# Patient Record
Sex: FEMALE | Race: WHITE | NOT HISPANIC OR LATINO
[De-identification: names, ages, dates, MRNs, and addresses within clinical notes are randomized per-mention and may not be internally consistent; named-entity substitution may affect disease eponyms.]

---

## 2017-07-24 ENCOUNTER — CHARTING TRANS (OUTPATIENT)
Dept: OTHER | Age: 57
End: 2017-07-24

## 2017-07-24 ENCOUNTER — LAB SERVICES (OUTPATIENT)
Dept: OTHER | Age: 57
End: 2017-07-24

## 2017-07-24 LAB — RAPID STREP GROUP A: POSITIVE

## 2018-03-26 ENCOUNTER — CHARTING TRANS (OUTPATIENT)
Dept: OTHER | Age: 58
End: 2018-03-26

## 2018-11-01 VITALS
WEIGHT: 120 LBS | HEART RATE: 72 BPM | TEMPERATURE: 99.5 F | HEIGHT: 62 IN | RESPIRATION RATE: 16 BRPM | BODY MASS INDEX: 22.08 KG/M2 | SYSTOLIC BLOOD PRESSURE: 125 MMHG | DIASTOLIC BLOOD PRESSURE: 82 MMHG

## 2018-11-03 VITALS
SYSTOLIC BLOOD PRESSURE: 143 MMHG | RESPIRATION RATE: 16 BRPM | HEIGHT: 62 IN | HEART RATE: 68 BPM | DIASTOLIC BLOOD PRESSURE: 80 MMHG | OXYGEN SATURATION: 97 % | BODY MASS INDEX: 22.08 KG/M2 | WEIGHT: 120 LBS | TEMPERATURE: 98.3 F

## 2019-01-18 ENCOUNTER — WALK IN (OUTPATIENT)
Dept: URGENT CARE | Age: 59
End: 2019-01-18

## 2019-01-18 VITALS
RESPIRATION RATE: 16 BRPM | DIASTOLIC BLOOD PRESSURE: 88 MMHG | BODY MASS INDEX: 22.6 KG/M2 | HEART RATE: 75 BPM | SYSTOLIC BLOOD PRESSURE: 130 MMHG | WEIGHT: 122.8 LBS | TEMPERATURE: 98 F | HEIGHT: 62 IN

## 2019-01-18 DIAGNOSIS — B95.0 STREPTOCOCCAL INFECTION GROUP A: Primary | ICD-10-CM

## 2019-01-18 LAB
INTERNAL PROCEDURAL CONTROLS ACCEPTABLE: YES
S PYO AG THROAT QL IA.RAPID: POSITIVE

## 2019-01-18 PROCEDURE — 99214 OFFICE O/P EST MOD 30 MIN: CPT | Performed by: NURSE PRACTITIONER

## 2019-01-18 PROCEDURE — 87880 STREP A ASSAY W/OPTIC: CPT | Performed by: NURSE PRACTITIONER

## 2019-01-18 RX ORDER — TRIAMCINOLONE ACETONIDE 55 UG/1
2 SPRAY, METERED NASAL
COMMUNITY

## 2019-01-18 RX ORDER — AMOXICILLIN 875 MG/1
875 TABLET, COATED ORAL 2 TIMES DAILY
Qty: 20 TABLET | Refills: 0 | Status: SHIPPED | OUTPATIENT
Start: 2019-01-18 | End: 2019-01-28

## 2019-01-18 SDOH — HEALTH STABILITY: MENTAL HEALTH: HOW OFTEN DO YOU HAVE A DRINK CONTAINING ALCOHOL?: NEVER

## 2019-01-18 ASSESSMENT — ENCOUNTER SYMPTOMS
ALLERGIC/IMMUNOLOGIC NEGATIVE: 1
SORE THROAT: 1
FATIGUE: 1
EYES NEGATIVE: 1
RESPIRATORY NEGATIVE: 1
ENDOCRINE NEGATIVE: 1
FEVER: 1
HEMATOLOGIC/LYMPHATIC NEGATIVE: 1
NEUROLOGICAL NEGATIVE: 1
PSYCHIATRIC NEGATIVE: 1
GASTROINTESTINAL NEGATIVE: 1

## 2024-04-11 ENCOUNTER — OFFICE VISIT (OUTPATIENT)
Age: 64
End: 2024-04-11
Payer: COMMERCIAL

## 2024-04-11 VITALS
WEIGHT: 118 LBS | SYSTOLIC BLOOD PRESSURE: 122 MMHG | DIASTOLIC BLOOD PRESSURE: 80 MMHG | BODY MASS INDEX: 21.71 KG/M2 | HEIGHT: 62 IN

## 2024-04-11 DIAGNOSIS — I78.1 SPIDER TELANGIECTASIS OF SKIN: Primary | ICD-10-CM

## 2024-04-11 RX ORDER — ZOLPIDEM TARTRATE 10 MG/1
10 TABLET ORAL NIGHTLY PRN
COMMUNITY
Start: 2024-04-09

## 2024-04-11 NOTE — PROGRESS NOTES
"Chief Complaint  New Patient (Bilateral spider veins. )    Subjective        HPI: Mohini York is a 64 y.o. woman seen for initial evaluation of spider veins.  She has a long history of spider veins in the lower extremities, and has undergone previous treatment, in the past with both good and fair results.  Most recent treatment session was about 2 years ago.  Now she presents with cosmetic concerns over existing spider veins.  No varicose veins.  No hasty symptoms.  No history of venous thromboembolism.  Has compression stockings but does not wear them.  Never had venous surgery.  Otherwise healthy.    Objective   Vital Signs:  /80   Ht 157.5 cm (62\")   Wt 53.5 kg (118 lb)   BMI 21.58 kg/m²     Exam: Easily palpable pedal artery pulses bilaterally.  No varicose veins.  Has spider veins in the right and left lower extremities, with especially prominent clusters in the medial lateral right knee area.  No venulectasias.  No recent or impending hemorrhage.  Possibly some matting, particularly on the lateral aspect by the right knee.  Calves soft and nontender.  No swelling or skin changes.  Right and left ankles and calves measure 19 and 33 cm bilaterally, respectively.    Result Review :        Mohini York  reports that she has quit smoking. Her smoking use included cigarettes. She has never used smokeless tobacco..           Assessment and Plan     Diagnoses and all orders for this visit:    1. Spider telangiectasis of skin (Primary)      Very pleasant 64-year-old woman with cosmetic concerns over spider veins in the right and left lower extremities.  Has had them treated before and presents today hoping for treatment again.  She has no varicose veins or symptoms suggesting major venous reflux.  I think she remains a candidate for injection sclerotherapy.  Has stockings that fit and knows where they are.  Schedule sclerotherapy, office, at her convenience.  Estimate 3 mL of solution.  Discussed about " the procedure and postprocedure care.  Questions answered.    Follow Up     No follow-ups on file.  Patient was given instructions and counseling regarding her condition or for health maintenance advice. Please see specific information pulled into the AVS if appropriate.